# Patient Record
Sex: FEMALE | Race: WHITE | NOT HISPANIC OR LATINO | Employment: UNEMPLOYED | URBAN - METROPOLITAN AREA
[De-identification: names, ages, dates, MRNs, and addresses within clinical notes are randomized per-mention and may not be internally consistent; named-entity substitution may affect disease eponyms.]

---

## 2021-08-11 PROBLEM — F31.75 BIPOLAR DISORDER, IN PARTIAL REMISSION, MOST RECENT EPISODE DEPRESSED: Status: ACTIVE | Noted: 2018-10-05

## 2021-08-11 PROBLEM — F41.1 GAD (GENERALIZED ANXIETY DISORDER): Status: ACTIVE | Noted: 2017-10-09

## 2022-03-02 PROBLEM — Z34.90 PREGNANCY: Status: ACTIVE | Noted: 2022-03-02

## 2023-04-10 ENCOUNTER — OFFICE VISIT (OUTPATIENT)
Dept: FAMILY MEDICINE CLINIC | Facility: CLINIC | Age: 43
End: 2023-04-10
Payer: COMMERCIAL

## 2023-04-10 VITALS
BODY MASS INDEX: 35.33 KG/M2 | OXYGEN SATURATION: 98 % | DIASTOLIC BLOOD PRESSURE: 72 MMHG | WEIGHT: 192 LBS | HEIGHT: 62 IN | HEART RATE: 67 BPM | SYSTOLIC BLOOD PRESSURE: 128 MMHG | RESPIRATION RATE: 20 BRPM

## 2023-04-10 DIAGNOSIS — F31.9 BIPOLAR DISORDER WITH DEPRESSION: Primary | ICD-10-CM

## 2023-04-10 DIAGNOSIS — F41.9 ANXIETY: ICD-10-CM

## 2023-04-10 DIAGNOSIS — F51.01 PRIMARY INSOMNIA: ICD-10-CM

## 2023-04-10 PROBLEM — G57.50 TARSAL TUNNEL SYNDROME: Status: ACTIVE | Noted: 2017-10-09

## 2023-04-10 PROBLEM — Z34.90 PREGNANCY: Status: RESOLVED | Noted: 2022-03-02 | Resolved: 2023-04-10

## 2023-04-10 PROBLEM — R20.2 NUMBNESS AND TINGLING: Status: ACTIVE | Noted: 2017-09-30

## 2023-04-10 PROBLEM — R20.2 PARESTHESIA OF BOTH HANDS: Status: ACTIVE | Noted: 2017-10-09

## 2023-04-10 PROBLEM — R20.0 NUMBNESS AND TINGLING: Status: ACTIVE | Noted: 2017-09-30

## 2023-04-10 PROBLEM — M79.2 NEUROPATHIC PAIN: Status: ACTIVE | Noted: 2017-09-30

## 2023-04-10 RX ORDER — QUETIAPINE FUMARATE 50 MG/1
TABLET, EXTENDED RELEASE ORAL
Qty: 30 TABLET | Refills: 0 | Status: SHIPPED | OUTPATIENT
Start: 2023-04-10
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
## 2023-04-11 ENCOUNTER — OFFICE VISIT (OUTPATIENT)
Dept: OBSTETRICS AND GYNECOLOGY | Facility: CLINIC | Age: 43
End: 2023-04-11
Payer: COMMERCIAL

## 2023-04-11 VITALS
DIASTOLIC BLOOD PRESSURE: 64 MMHG | WEIGHT: 192.6 LBS | BODY MASS INDEX: 35.44 KG/M2 | HEIGHT: 62 IN | SYSTOLIC BLOOD PRESSURE: 118 MMHG

## 2023-04-11 DIAGNOSIS — Z01.419 ENCOUNTER FOR GYNECOLOGICAL EXAMINATION WITHOUT ABNORMAL FINDING: Primary | ICD-10-CM

## 2023-04-11 LAB
ALBUMIN SERPL-MCNC: 4.7 G/DL (ref 3.8–4.8)
ALBUMIN/GLOB SERPL: 2 {RATIO} (ref 1.2–2.2)
ALP SERPL-CCNC: 63 IU/L (ref 44–121)
ALT SERPL-CCNC: 14 IU/L (ref 0–32)
APPEARANCE UR: CLEAR
AST SERPL-CCNC: 16 IU/L (ref 0–40)
BACTERIA #/AREA URNS HPF: NORMAL /[HPF]
BASOPHILS # BLD AUTO: 0 X10E3/UL (ref 0–0.2)
BASOPHILS NFR BLD AUTO: 0 %
BILIRUB SERPL-MCNC: 0.3 MG/DL (ref 0–1.2)
BILIRUB UR QL STRIP: NEGATIVE
BUN SERPL-MCNC: 11 MG/DL (ref 6–24)
BUN/CREAT SERPL: 15 (ref 9–23)
CALCIUM SERPL-MCNC: 9.5 MG/DL (ref 8.7–10.2)
CASTS URNS QL MICRO: NORMAL /LPF
CHLORIDE SERPL-SCNC: 104 MMOL/L (ref 96–106)
CO2 SERPL-SCNC: 23 MMOL/L (ref 20–29)
COLOR UR: YELLOW
CREAT SERPL-MCNC: 0.71 MG/DL (ref 0.57–1)
EGFRCR SERPLBLD CKD-EPI 2021: 109 ML/MIN/1.73
EOSINOPHIL # BLD AUTO: 0.1 X10E3/UL (ref 0–0.4)
EOSINOPHIL NFR BLD AUTO: 2 %
EPI CELLS #/AREA URNS HPF: NORMAL /HPF (ref 0–10)
ERYTHROCYTE [DISTWIDTH] IN BLOOD BY AUTOMATED COUNT: 12.1 % (ref 11.7–15.4)
GLOBULIN SER CALC-MCNC: 2.4 G/DL (ref 1.5–4.5)
GLUCOSE SERPL-MCNC: 97 MG/DL (ref 70–99)
GLUCOSE UR QL STRIP: NEGATIVE
HCT VFR BLD AUTO: 42 % (ref 34–46.6)
HGB BLD-MCNC: 14.3 G/DL (ref 11.1–15.9)
HGB UR QL STRIP: NEGATIVE
IMM GRANULOCYTES # BLD AUTO: 0 X10E3/UL (ref 0–0.1)
IMM GRANULOCYTES NFR BLD AUTO: 0 %
KETONES UR QL STRIP: NEGATIVE
LEUKOCYTE ESTERASE UR QL STRIP: NEGATIVE
LYMPHOCYTES # BLD AUTO: 1.3 X10E3/UL (ref 0.7–3.1)
LYMPHOCYTES NFR BLD AUTO: 21 %
MCH RBC QN AUTO: 30.4 PG (ref 26.6–33)
MCHC RBC AUTO-ENTMCNC: 34 G/DL (ref 31.5–35.7)
MCV RBC AUTO: 89 FL (ref 79–97)
MICRO URNS: ABNORMAL
MICRO URNS: ABNORMAL
MONOCYTES # BLD AUTO: 0.3 X10E3/UL (ref 0.1–0.9)
MONOCYTES NFR BLD AUTO: 5 %
NEUTROPHILS # BLD AUTO: 4.5 X10E3/UL (ref 1.4–7)
NEUTROPHILS NFR BLD AUTO: 72 %
NITRITE UR QL STRIP: NEGATIVE
PH UR STRIP: 8.5 [PH] (ref 5–7.5)
PLATELET # BLD AUTO: 225 X10E3/UL (ref 150–450)
POTASSIUM SERPL-SCNC: 4.1 MMOL/L (ref 3.5–5.2)
PROT SERPL-MCNC: 7.1 G/DL (ref 6–8.5)
PROT UR QL STRIP: NEGATIVE
RBC # BLD AUTO: 4.7 X10E6/UL (ref 3.77–5.28)
RBC #/AREA URNS HPF: NORMAL /HPF (ref 0–2)
SODIUM SERPL-SCNC: 140 MMOL/L (ref 134–144)
SP GR UR STRIP: 1.02 (ref 1–1.03)
UROBILINOGEN UR STRIP-MCNC: 0.2 MG/DL (ref 0.2–1)
WBC # BLD AUTO: 6.3 X10E3/UL (ref 3.4–10.8)
WBC #/AREA URNS HPF: NORMAL /HPF (ref 0–5)

## 2023-04-11 NOTE — PROGRESS NOTES
GYN Annual Exam     CC- Here for annual exam.     Cherie Gutierrez is a 42 y.o. female who presents for annual well woman exam. Periods are regular every 28-30 days, lasting a few days. Dysmenorrhea:none. Cyclic symptoms include none. No intermenstrual bleeding, spotting, or discharge.    She has noted more pelvic prolapse symptoms and has felt significant fullness in the vagina and prolapsed tissue when she is carrying her baby for longer periods of time.  Her baby is now 13 months old and doing well.    OB History        7    Para   4    Term   4       0    AB   3    Living   4       SAB   1    IAB   2    Ectopic   0    Molar   0    Multiple   0    Live Births   4                Current contraception: none  History of abnormal Pap smear: no  Family history of uterine, colon or ovarian cancer: yes - Maternal grandmother with colon cancer  History of abnormal mammogram: no  Family history of breast cancer: no  Last Pap : Several years ago    Past Medical History:   Diagnosis Date   • Abnormal Pap smear of cervix    • Anxiety    • Asthma    • Bipolar 1 disorder, depressed    • Insomnia        Past Surgical History:   Procedure Laterality Date   • DILATATION AND CURETTAGE     • WISDOM TOOTH EXTRACTION           Current Outpatient Medications:   •  QUEtiapine fumarate ER (SEROquel XR) 50 MG tablet sustained-release 24 hour tablet, 1 tab nighty x 3 days,  then 2 tabs nightly, Disp: 30 tablet, Rfl: 0  •  venlafaxine XR (EFFEXOR-XR) 150 MG 24 hr capsule, Take 1 capsule by mouth Daily. (Patient taking differently: Take 1 capsule by mouth Daily. Weaning off), Disp: 90 capsule, Rfl: 3    No Known Allergies    Social History     Tobacco Use   • Smoking status: Some Days     Types: Cigarettes   • Smokeless tobacco: Never   Vaping Use   • Vaping Use: Every day   • Substances: Nicotine, Flavoring   Substance Use Topics   • Alcohol use: Yes     Comment: OCC every few weeks   • Drug use: Yes     Types: Marijuana  "      Family History   Problem Relation Age of Onset   • Colon cancer Maternal Grandmother    • Autism Son        Review of Systems   Constitutional: Negative for fatigue and fever.   Respiratory: Negative for cough and shortness of breath.    Genitourinary: Positive for pelvic pressure. Negative for menstrual problem.       /64   Ht 157.5 cm (62\")   Wt 87.4 kg (192 lb 9.6 oz)   LMP 03/20/2023 (Approximate)   BMI 35.23 kg/m²     Physical Exam  Constitutional:       Appearance: She is normal weight.   Genitourinary:      Bladder and urethral meatus normal.      No lesions in the vagina.      Right Labia: No lesions.     Left Labia: No lesions.     No vaginal tenderness or bleeding.      Anterior and posterior vaginal prolapse present.       Right Adnexa: not tender, not full and no mass present.     Left Adnexa: not tender, not full and no mass present.     Uterus is prolapsed.      Uterus is not enlarged, fixed or tender.      No uterine mass detected.     Uterus is midaxial.   Breasts:     Right: No mass, nipple discharge, skin change or tenderness.      Left: No mass, nipple discharge, skin change or tenderness.   Neck:      Thyroid: No thyroid mass or thyromegaly.   Abdominal:      General: Abdomen is flat.      Palpations: Abdomen is soft. There is no mass.      Tenderness: There is no abdominal tenderness.   Neurological:      Mental Status: She is alert.   Vitals reviewed.               Assessment     1) GYN annual well woman exam.   2) pelvic organ prolapse with moderate cystocele and rectocele and uterine prolapse.  Discussed with patient conservative measures and likelihood/chance of progression of symptoms that might require more aggressive management.     Plan     1) Breast Health - Clinical breast exam yearly, Discussed American cancer society recommendations for breast cancer screening, and Self breast awareness monthly  2) Pap -done with high risk HPV  3) Smoking status-positive  4) " Encouraged to be wary of information obtained via social media and internet based on source and search.  5) Follow up prn and one year.       Eder Collins MD   4/11/2023  15:18 EDT

## 2023-04-12 ENCOUNTER — TELEPHONE (OUTPATIENT)
Dept: FAMILY MEDICINE CLINIC | Facility: CLINIC | Age: 43
End: 2023-04-12
Payer: COMMERCIAL

## 2023-04-12 DIAGNOSIS — Z32.00 POSSIBLE PREGNANCY: Primary | ICD-10-CM

## 2023-04-12 LAB — SPECIMEN STATUS: NORMAL

## 2023-04-12 RX ORDER — VENLAFAXINE HYDROCHLORIDE 75 MG/1
75 CAPSULE, EXTENDED RELEASE ORAL DAILY
Qty: 3 CAPSULE | Refills: 0 | Status: SHIPPED | OUTPATIENT
Start: 2023-04-12 | End: 2023-04-15

## 2023-04-12 NOTE — TELEPHONE ENCOUNTER
Pt called stating she was seen on 4/10 and told to taper off her meds by vale. Can't break the meds in 1/2 because they are extended release capsules. Please call pt back at [phone].

## 2023-04-14 ENCOUNTER — PATIENT ROUNDING (BHMG ONLY) (OUTPATIENT)
Dept: FAMILY MEDICINE CLINIC | Facility: CLINIC | Age: 43
End: 2023-04-14
Payer: COMMERCIAL

## 2023-04-14 NOTE — PROGRESS NOTES
A [employer] message has been sent to the patient for PATIENT ROUNDING with [facility].

## 2023-04-16 LAB
CYTOLOGIST CVX/VAG CYTO: NORMAL
CYTOLOGY CVX/VAG DOC CYTO: NORMAL
CYTOLOGY CVX/VAG DOC THIN PREP: NORMAL
DX ICD CODE: NORMAL
HIV 1 & 2 AB SER-IMP: NORMAL
HPV GENOTYPE REFLEX: NORMAL
HPV I/H RISK 4 DNA CVX QL PROBE+SIG AMP: NEGATIVE
OTHER STN SPEC: NORMAL
STAT OF ADQ CVX/VAG CYTO-IMP: NORMAL

## 2023-04-28 RX ORDER — QUETIAPINE FUMARATE 50 MG/1
TABLET, EXTENDED RELEASE ORAL
Qty: 30 TABLET | Refills: 0 | Status: SHIPPED | OUTPATIENT
Start: 2023-04-28

## 2023-05-02 ENCOUNTER — OFFICE VISIT (OUTPATIENT)
Dept: FAMILY MEDICINE CLINIC | Facility: CLINIC | Age: 43
End: 2023-05-02
Payer: COMMERCIAL

## 2023-05-02 VITALS
RESPIRATION RATE: 18 BRPM | BODY MASS INDEX: 34.41 KG/M2 | HEART RATE: 78 BPM | HEIGHT: 62 IN | DIASTOLIC BLOOD PRESSURE: 80 MMHG | SYSTOLIC BLOOD PRESSURE: 128 MMHG | OXYGEN SATURATION: 98 % | WEIGHT: 187 LBS

## 2023-05-02 DIAGNOSIS — F31.81 BIPOLAR 2 DISORDER, MAJOR DEPRESSIVE EPISODE: ICD-10-CM

## 2023-05-02 DIAGNOSIS — R09.82 PND (POST-NASAL DRIP): Primary | ICD-10-CM

## 2023-05-02 DIAGNOSIS — F41.1 GAD (GENERALIZED ANXIETY DISORDER): ICD-10-CM

## 2023-05-02 DIAGNOSIS — J30.2 SEASONAL ALLERGIES: ICD-10-CM

## 2023-05-02 RX ORDER — CETIRIZINE HYDROCHLORIDE 10 MG/1
10 TABLET ORAL DAILY
Qty: 30 TABLET | Refills: 0 | Status: SHIPPED | OUTPATIENT
Start: 2023-05-02

## 2023-05-02 RX ORDER — FLUTICASONE PROPIONATE 50 MCG
2 SPRAY, SUSPENSION (ML) NASAL DAILY
Qty: 18.2 ML | Refills: 2 | Status: SHIPPED | OUTPATIENT
Start: 2023-05-02

## 2023-05-02 RX ORDER — HYDROXYZINE HYDROCHLORIDE 25 MG/1
25 TABLET, FILM COATED ORAL 2 TIMES DAILY PRN
Qty: 60 TABLET | Refills: 0 | Status: SHIPPED | OUTPATIENT
Start: 2023-05-02 | End: 2023-06-01

## 2023-05-02 RX ORDER — ESCITALOPRAM OXALATE 10 MG/1
10 TABLET ORAL DAILY
Qty: 30 TABLET | Refills: 1 | Status: SHIPPED | OUTPATIENT
Start: 2023-05-02 | End: 2023-06-01

## 2023-05-02 RX ORDER — QUETIAPINE FUMARATE 50 MG/1
TABLET, EXTENDED RELEASE ORAL
Qty: 30 TABLET | Refills: 1 | Status: SHIPPED | OUTPATIENT
Start: 2023-05-02

## 2023-05-02 NOTE — PROGRESS NOTES
Emilia Gutierrez is a 42 y.o. female.     History of Present Illness   Established patient here for acute cough and for follow-up on bipolar depression    Acute coughing  X 10 days , stuffy nose. She is feeling better today. No fever., Took some benadryl and ibuprofen and using humidifier.     Acute on chronic bipolar II, worsening anxiety and depression. Back on seroquel 100 mg nightly. H.o panic attacks.  Partner is with her and reports that there are days where she is nonfunctional due to panic attacks.  Patient has upcoming psych APRN appointment June 8.  She reports she had a rough couple of days this last week but is feeling more stable today.  She denies SI/HI.  She is asking for additional medication to help with panic and anxiety prior to psychiatric APRN appointment.  She has tried Lexapro and Prozac in the past.  She cannot recall what other medications she has been on.  She does not think she has ever been on lithium or Depakote.    The following portions of the patient's history were reviewed and updated as appropriate: allergies, current medications, past family history, past medical history, past social history, past surgical history and problem list.    Review of Systems   HENT: Positive for postnasal drip.         Itchy nose   Eyes: Positive for itching.   Allergic/Immunologic: Positive for environmental allergies.       Objective   Physical Exam  Vitals reviewed.   Constitutional:       Appearance: Normal appearance.   HENT:      Right Ear: Tympanic membrane normal.      Left Ear: There is impacted cerumen.      Mouth/Throat:      Mouth: Mucous membranes are moist.   Cardiovascular:      Rate and Rhythm: Normal rate and regular rhythm.      Pulses: Normal pulses.      Heart sounds: Normal heart sounds.   Pulmonary:      Effort: Pulmonary effort is normal.      Breath sounds: Normal breath sounds. No wheezing or rhonchi.   Musculoskeletal:      Cervical back: Normal range of motion.    Lymphadenopathy:      Cervical: No cervical adenopathy.   Skin:     General: Skin is warm.   Neurological:      Mental Status: She is alert.   Psychiatric:         Mood and Affect: Mood is anxious and depressed.         Vitals:    05/02/23 1415   BP: 128/80   Pulse: 78   Resp: 18   SpO2: 98%     Body mass index is 34.2 kg/m².    Procedures    Assessment & Plan   Problems Addressed this Visit        Mental Health    Bipolar 2 disorder, major depressive episode    Relevant Medications    QUEtiapine fumarate ER (SEROquel XR) 50 MG tablet sustained-release 24 hour tablet    escitalopram (Lexapro) 10 MG tablet    hydrOXYzine (ATARAX) 25 MG tablet    TEE (generalized anxiety disorder)    Relevant Medications    QUEtiapine fumarate ER (SEROquel XR) 50 MG tablet sustained-release 24 hour tablet    escitalopram (Lexapro) 10 MG tablet    hydrOXYzine (ATARAX) 25 MG tablet   Other Visit Diagnoses     PND (post-nasal drip)    -  Primary    Seasonal allergies          Diagnoses       Codes Comments    PND (post-nasal drip)    -  Primary ICD-10-CM: R09.82  ICD-9-CM: 784.91     Seasonal allergies     ICD-10-CM: J30.2  ICD-9-CM: 477.9     Bipolar 2 disorder, major depressive episode     ICD-10-CM: F31.81  ICD-9-CM: 296.89     TEE (generalized anxiety disorder)     ICD-10-CM: F41.1  ICD-9-CM: 300.02         PND/allergic symptoms-take Zyrtec 10 mg daily, Rx Flonase 2 sprays daily, limit triggers, use saline nasal spray, humidifier.  Call if fever or worsening symptoms    Bipolar/anxiety/depression- she has previously been on venlafaxine and does not wish to continue this medication, discussed SSRI Lexapro 10 mg daily, reviewed side effect profile, discussed hydroxyzine 25 mg twice daily for panic attack prevention and anxiety management.  Reviewed side effect profile including sedation.  Do not drink alcohol or drive with this medication.  Reviewed increased risk of suicidality with starting SSRI.  Call [phone] or go to ER for worsening  symptoms continue with Seroquel 50 mg nightly x3 days and then double to 100 mg nightly.         Education provided in AVS   No follow-ups on file.  Answers for HPI/ROS submitted by the patient on 5/1/2023  Please describe your symptoms.: Coming back for a med check. Also I had another crisis over the weekend  Have you had these symptoms before?: Yes  How long have you been having these symptoms?: Greater than 2 weeks  Please list any medications you are currently taking for this condition.: Venlafaxine, Seroquel  Please describe any probable cause for these symptoms. : Bipolar 2  What is the primary reason for your visit?: Other

## 2023-05-16 ENCOUNTER — OFFICE VISIT (OUTPATIENT)
Dept: FAMILY MEDICINE CLINIC | Facility: CLINIC | Age: 43
End: 2023-05-16
Payer: COMMERCIAL

## 2023-05-16 VITALS
OXYGEN SATURATION: 99 % | HEART RATE: 70 BPM | WEIGHT: 194 LBS | DIASTOLIC BLOOD PRESSURE: 70 MMHG | SYSTOLIC BLOOD PRESSURE: 110 MMHG | HEIGHT: 62 IN | BODY MASS INDEX: 35.7 KG/M2

## 2023-05-16 DIAGNOSIS — F31.81 BIPOLAR 2 DISORDER, MAJOR DEPRESSIVE EPISODE: Primary | ICD-10-CM

## 2023-05-16 DIAGNOSIS — G89.29 CHRONIC MIDLINE LOW BACK PAIN WITHOUT SCIATICA: ICD-10-CM

## 2023-05-16 DIAGNOSIS — M54.50 CHRONIC MIDLINE LOW BACK PAIN WITHOUT SCIATICA: ICD-10-CM

## 2023-05-16 RX ORDER — ACETAMINOPHEN 500 MG
1000 TABLET ORAL EVERY 8 HOURS PRN
Qty: 170 TABLET | Refills: 0 | Status: SHIPPED | OUTPATIENT
Start: 2023-05-16 | End: 2023-06-15

## 2023-05-16 RX ORDER — QUETIAPINE FUMARATE 50 MG/1
TABLET, EXTENDED RELEASE ORAL
Qty: 60 TABLET | Refills: 1 | Status: SHIPPED | OUTPATIENT
Start: 2023-05-16
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
## 2023-05-16 NOTE — PATIENT INSTRUCTIONS
Magnesium glycinate 500 mg nightly for muscle pain, insomnia, anxiety    Lidocaine patches for pain  [place] patch    Look up antiinflammatory diet

## 2023-05-30 RX ORDER — CETIRIZINE HYDROCHLORIDE 10 MG/1
10 TABLET ORAL DAILY
Qty: 30 TABLET | Refills: 0 | Status: SHIPPED | OUTPATIENT
Start: 2023-05-30

## 2023-06-06 RX ORDER — HYDROXYZINE HYDROCHLORIDE 25 MG/1
TABLET, FILM COATED ORAL
Qty: 60 TABLET | Refills: 0 | Status: SHIPPED | OUTPATIENT
Start: 2023-06-06

## 2023-06-16 ENCOUNTER — TELEPHONE (OUTPATIENT)
Dept: FAMILY MEDICINE CLINIC | Facility: CLINIC | Age: 43
End: 2023-06-16

## 2023-06-16 NOTE — TELEPHONE ENCOUNTER
"    Caller: Cherie Gutierrez \"Melida\"    Relationship: Self    Best call back number: [phone]    What medications are you currently taking:   Current Outpatient Medications on File Prior to Visit   Medication Sig Dispense Refill    [] acetaminophen (TYLENOL) 500 MG tablet Take 2 tablets by mouth Every 8 (Eight) Hours As Needed for Mild Pain for up to 30 days. 170 tablet 0    cetirizine (zyrTEC) 10 MG tablet TAKE 1 TABLET BY MOUTH DAILY 30 tablet 0    [] Diclofenac Sodium (VOLTAREN) 1 % gel gel Apply 4 g topically to the appropriate area as directed 3 (Three) Times a Day for 30 days. 100 g 0    escitalopram (Lexapro) 10 MG tablet Take 1 tablet by mouth Daily for 30 days. 30 tablet 1    fluticasone (FLONASE) 50 MCG/ACT nasal spray 2 sprays into the nostril(s) as directed by provider Daily. 18.2 mL 2    hydrOXYzine (ATARAX) 25 MG tablet TAKE 1 TABLET BY MOUTH TWICE DAILY AS NEEDED FOR ANXIETY 60 tablet 0    QUEtiapine fumarate ER (SEROquel XR) 50 MG tablet sustained-release 24 hour tablet TAKE 2 TABs BY MOUTH EVERY EVENING 60 tablet 1     No current facility-administered medications on file prior to visit.            Which medication are you concerned about:     QUEtiapine fumarate ER (SEROquel XR) 50 MG tablet sustained-release 24 hour tablet       Who prescribed you this medication: DARRON MARION    What are your concerns: BEEN TAKING THIS MEDICATION A COUPLE OF MONTHS BUT LAST NIGHT WAS HOME ALONE AND BABY WOKE UP, WAS AWAKE A LONG TIME AND PATIENT HAD MUCH DIFFICULTY STAYING UP WITH BABY.    SHE ALSO THOUGHT SHE HAD HALLUCINATIONS WITH IT(WHILE TRYING TO STAY AWAKE), EXTREMELY   GROGGY    SHE WANTS PROVIDER TO KNOW THAT SHE WILL STOP THE MEDICATION, WANTS TO KNOW IF SHE NEEDS TO WEAN OFF FIRST.    "

## 2023-08-03 ENCOUNTER — OFFICE VISIT (OUTPATIENT)
Dept: FAMILY MEDICINE CLINIC | Facility: CLINIC | Age: 43
End: 2023-08-03
Payer: COMMERCIAL

## 2023-08-03 VITALS
SYSTOLIC BLOOD PRESSURE: 132 MMHG | HEIGHT: 62 IN | OXYGEN SATURATION: 100 % | RESPIRATION RATE: 18 BRPM | BODY MASS INDEX: 34.41 KG/M2 | HEART RATE: 80 BPM | WEIGHT: 187 LBS | DIASTOLIC BLOOD PRESSURE: 92 MMHG

## 2023-08-03 DIAGNOSIS — G89.29 CHRONIC GENERALIZED PAIN: ICD-10-CM

## 2023-08-03 DIAGNOSIS — F31.9 BIPOLAR 1 DISORDER: ICD-10-CM

## 2023-08-03 DIAGNOSIS — R52 CHRONIC GENERALIZED PAIN: ICD-10-CM

## 2023-08-03 DIAGNOSIS — R53.82 CHRONIC FATIGUE: ICD-10-CM

## 2023-08-03 PROBLEM — F31.81 BIPOLAR 2 DISORDER, MAJOR DEPRESSIVE EPISODE: Status: RESOLVED | Noted: 2018-10-05 | Resolved: 2023-08-03

## 2023-08-03 RX ORDER — ARIPIPRAZOLE 5 MG/1
5 TABLET ORAL DAILY
Qty: 30 TABLET | Refills: 0
Start: 2023-08-03 | End: 2023-09-02

## 2023-08-03 RX ORDER — ESCITALOPRAM OXALATE 10 MG/1
TABLET ORAL
COMMUNITY
Start: 2023-07-24 | End: 2023-08-03

## 2023-08-03 RX ORDER — QUETIAPINE FUMARATE 50 MG/1
TABLET, EXTENDED RELEASE ORAL
COMMUNITY
Start: 2023-07-24 | End: 2023-08-03

## 2023-08-04 LAB
25(OH)D3+25(OH)D2 SERPL-MCNC: 24.8 NG/ML (ref 30–100)
ALBUMIN SERPL-MCNC: 4.7 G/DL (ref 3.9–4.9)
ALBUMIN/GLOB SERPL: 2.2 {RATIO} (ref 1.2–2.2)
ALP SERPL-CCNC: 73 IU/L (ref 44–121)
ALT SERPL-CCNC: 17 IU/L (ref 0–32)
ANA SER QL: NEGATIVE
AST SERPL-CCNC: 15 IU/L (ref 0–40)
BASOPHILS # BLD AUTO: 0 X10E3/UL (ref 0–0.2)
BASOPHILS NFR BLD AUTO: 0 %
BILIRUB SERPL-MCNC: 0.3 MG/DL (ref 0–1.2)
BUN SERPL-MCNC: 14 MG/DL (ref 6–24)
BUN/CREAT SERPL: 19 (ref 9–23)
CALCIUM SERPL-MCNC: 9.6 MG/DL (ref 8.7–10.2)
CHLORIDE SERPL-SCNC: 102 MMOL/L (ref 96–106)
CK SERPL-CCNC: 74 U/L (ref 32–182)
CO2 SERPL-SCNC: 24 MMOL/L (ref 20–29)
CREAT SERPL-MCNC: 0.72 MG/DL (ref 0.57–1)
EGFRCR SERPLBLD CKD-EPI 2021: 107 ML/MIN/1.73
EOSINOPHIL # BLD AUTO: 0.1 X10E3/UL (ref 0–0.4)
EOSINOPHIL NFR BLD AUTO: 1 %
ERYTHROCYTE [DISTWIDTH] IN BLOOD BY AUTOMATED COUNT: 12.3 % (ref 11.7–15.4)
ERYTHROCYTE [SEDIMENTATION RATE] IN BLOOD BY WESTERGREN METHOD: 2 MM/HR (ref 0–32)
FOLATE SERPL-MCNC: 13 NG/ML
GLOBULIN SER CALC-MCNC: 2.1 G/DL (ref 1.5–4.5)
GLUCOSE SERPL-MCNC: 81 MG/DL (ref 70–99)
HCT VFR BLD AUTO: 39 % (ref 34–46.6)
HGB BLD-MCNC: 12.9 G/DL (ref 11.1–15.9)
IMM GRANULOCYTES # BLD AUTO: 0 X10E3/UL (ref 0–0.1)
IMM GRANULOCYTES NFR BLD AUTO: 0 %
LYMPHOCYTES # BLD AUTO: 2.4 X10E3/UL (ref 0.7–3.1)
LYMPHOCYTES NFR BLD AUTO: 35 %
MAGNESIUM SERPL-MCNC: 2 MG/DL (ref 1.6–2.3)
MCH RBC QN AUTO: 29.3 PG (ref 26.6–33)
MCHC RBC AUTO-ENTMCNC: 33.1 G/DL (ref 31.5–35.7)
MCV RBC AUTO: 88 FL (ref 79–97)
MONOCYTES # BLD AUTO: 0.5 X10E3/UL (ref 0.1–0.9)
MONOCYTES NFR BLD AUTO: 7 %
NEUTROPHILS # BLD AUTO: 3.9 X10E3/UL (ref 1.4–7)
NEUTROPHILS NFR BLD AUTO: 57 %
PLATELET # BLD AUTO: 256 X10E3/UL (ref 150–450)
POTASSIUM SERPL-SCNC: 4 MMOL/L (ref 3.5–5.2)
PROT SERPL-MCNC: 6.8 G/DL (ref 6–8.5)
RBC # BLD AUTO: 4.41 X10E6/UL (ref 3.77–5.28)
SODIUM SERPL-SCNC: 140 MMOL/L (ref 134–144)
T4 FREE SERPL-MCNC: 1.29 NG/DL (ref 0.82–1.77)
THYROPEROXIDASE AB SERPL-ACNC: 10 IU/ML (ref 0–34)
TSH SERPL DL<=0.005 MIU/L-ACNC: 0.79 UIU/ML (ref 0.45–4.5)
VIT B12 SERPL-MCNC: 356 PG/ML (ref 232–1245)
WBC # BLD AUTO: 6.9 X10E3/UL (ref 3.4–10.8)

## 2023-08-07 RX ORDER — ERGOCALCIFEROL 1.25 MG/1
50000 CAPSULE ORAL WEEKLY
Qty: 8 CAPSULE | Refills: 0 | Status: SHIPPED | OUTPATIENT
Start: 2023-08-07 | End: 2023-10-06

## 2023-08-16 RX ORDER — PSEUDOEPHED/ACETAMINOPH/DIPHEN 30MG-500MG
TABLET ORAL
Qty: 170 TABLET | Refills: 0 | Status: SHIPPED | OUTPATIENT
Start: 2023-08-16

## 2024-05-01 ENCOUNTER — OFFICE VISIT (OUTPATIENT)
Dept: FAMILY MEDICINE CLINIC | Facility: CLINIC | Age: 44
End: 2024-05-01
Payer: COMMERCIAL

## 2024-05-01 VITALS
SYSTOLIC BLOOD PRESSURE: 126 MMHG | WEIGHT: 180 LBS | HEIGHT: 62 IN | RESPIRATION RATE: 18 BRPM | BODY MASS INDEX: 33.13 KG/M2 | DIASTOLIC BLOOD PRESSURE: 94 MMHG

## 2024-05-01 DIAGNOSIS — E66.1 CLASS 1 DRUG-INDUCED OBESITY WITHOUT SERIOUS COMORBIDITY WITH BODY MASS INDEX (BMI) OF 32.0 TO 32.9 IN ADULT: ICD-10-CM

## 2024-05-01 DIAGNOSIS — M25.551 RIGHT HIP PAIN: ICD-10-CM

## 2024-05-01 DIAGNOSIS — Z13.220 SCREENING, LIPID: ICD-10-CM

## 2024-05-01 DIAGNOSIS — F31.9 BIPOLAR 1 DISORDER: ICD-10-CM

## 2024-05-01 DIAGNOSIS — Z00.00 ANNUAL PHYSICAL EXAM: Primary | ICD-10-CM

## 2024-05-01 DIAGNOSIS — Z12.31 SCREENING MAMMOGRAM FOR BREAST CANCER: ICD-10-CM

## 2024-05-01 DIAGNOSIS — Z23 NEED FOR VACCINATION: ICD-10-CM

## 2024-05-01 DIAGNOSIS — Z13.228 SCREENING FOR METABOLIC DISORDER: ICD-10-CM

## 2024-05-01 PROBLEM — E66.811 CLASS 1 DRUG-INDUCED OBESITY WITHOUT SERIOUS COMORBIDITY WITH BODY MASS INDEX (BMI) OF 32.0 TO 32.9 IN ADULT: Status: ACTIVE | Noted: 2024-05-01

## 2024-05-01 PROCEDURE — 91320 SARSCV2 VAC 30MCG TRS-SUC IM: CPT | Performed by: NURSE PRACTITIONER

## 2024-05-01 PROCEDURE — 99396 PREV VISIT EST AGE 40-64: CPT | Performed by: NURSE PRACTITIONER

## 2024-05-01 PROCEDURE — 90715 TDAP VACCINE 7 YRS/> IM: CPT | Performed by: NURSE PRACTITIONER

## 2024-05-01 PROCEDURE — 90480 ADMN SARSCOV2 VAC 1/ONLY CMP: CPT | Performed by: NURSE PRACTITIONER

## 2024-05-01 PROCEDURE — 90471 IMMUNIZATION ADMIN: CPT | Performed by: NURSE PRACTITIONER

## 2024-05-01 RX ORDER — MELOXICAM 7.5 MG/1
TABLET ORAL
Qty: 30 TABLET | Refills: 3 | Status: SHIPPED | OUTPATIENT
Start: 2024-05-01

## 2024-05-01 RX ORDER — ARIPIPRAZOLE 10 MG/1
1 TABLET ORAL DAILY
COMMUNITY
Start: 2024-04-26

## 2024-05-01 RX ORDER — ERGOCALCIFEROL 1.25 MG/1
50000 CAPSULE ORAL WEEKLY
Qty: 8 CAPSULE | Refills: 0 | Status: SHIPPED | OUTPATIENT
Start: 2024-05-01 | End: 2024-06-30

## 2024-05-01 NOTE — PROGRESS NOTES
Subjective   Cherie Gutierrez is a 43 y.o. female.   Patient here for annual physical exam, labs, chronic illness management and updated screening.      History of Present Illness   Chronic Bipolar 1 x years. Seeing online psych and is transitioning to new in person psychiatry. Mood is stable on Abilify 5 mg, venlafaxine 150, hydroxyzine 25 qhs. She uses this as prn for panic attack. Denies SI/HI.   PHQ-2 Depression Screening  Little interest or pleasure in doing things? 0-->not at all   Feeling down, depressed, or hopeless? 0-->not at all   PHQ-2 Total Score 0     Chronic R hip x months. She has prev done some physical therapy at home on phone [facility]. She can bear weight and walk normally, better if she is lying down. She has tried ibuprofen and tylenol without ,much relief.      The following portions of the patient's history were reviewed and updated as appropriate: allergies, current medications, past family history, past medical history, past social history, past surgical history and problem list.    Review of Systems   Constitutional:  Negative for activity change, fatigue, unexpected weight gain and unexpected weight loss.   HENT:  Negative for congestion and postnasal drip.         Dental exam is due      Eyes:  Negative for blurred vision and double vision.        No vision changes, no glasses    Respiratory:  Negative for cough and chest tightness.    Cardiovascular:  Negative for chest pain, palpitations and leg swelling.   Gastrointestinal:  Negative for abdominal pain, blood in stool, constipation, diarrhea and GERD.   Endocrine: Negative for cold intolerance, heat intolerance, polydipsia, polyphagia and polyuria.   Genitourinary:  Negative for dysuria and frequency.   Musculoskeletal:  Positive for arthralgias (R hip).   Skin:  Negative for rash.   Allergic/Immunologic: Negative for environmental allergies.   Neurological:  Negative for dizziness and seizures.   Hematological:  Does not bruise/bleed easily.    Psychiatric/Behavioral:  Positive for stress. Negative for depressed mood. The patient is nervous/anxious.          Current Outpatient Medications:     Acetaminophen Extra Strength 500 MG tablet, TAKE 2 TABLETS BY MOUTH EVERY 8 HOURS AS NEEDED FOR PAIN, Disp: 170 tablet, Rfl: 0    ARIPiprazole (ABILIFY) 10 MG tablet, Take 1 tablet by mouth Daily., Disp: , Rfl:     cetirizine (zyrTEC) 10 MG tablet, TAKE 1 TABLET BY MOUTH DAILY, Disp: 90 tablet, Rfl: 0    fluticasone (FLONASE) 50 MCG/ACT nasal spray, 2 sprays into the nostril(s) as directed by provider Daily., Disp: 18.2 mL, Rfl: 2    hydrOXYzine (ATARAX) 25 MG tablet, TAKE 1 TABLET BY MOUTH TWICE DAILY AS NEEDED FOR ANXIETY, Disp: 60 tablet, Rfl: 0    meloxicam (Mobic) 7.5 MG tablet, Daily w food for pain (no aleve , ibuprofen or advil), Disp: 30 tablet, Rfl: 3    venlafaxine XR (EFFEXOR-XR) 150 MG 24 hr capsule, Take 225 mg by mouth., Disp: , Rfl:     vitamin D (ERGOCALCIFEROL) 1.25 MG (45978 UT) capsule capsule, Take 1 capsule by mouth 1 (One) Time Per Week for 60 days., Disp: 8 capsule, Rfl: 0    Objective   Physical Exam  Vitals reviewed.   Constitutional:       Appearance: Normal appearance. She is normal weight.   HENT:      Head: Normocephalic.      Right Ear: Tympanic membrane normal.      Left Ear: Tympanic membrane normal.      Nose: Nose normal.      Mouth/Throat:      Mouth: Mucous membranes are moist.   Eyes:      Pupils: Pupils are equal, round, and reactive to light.   Cardiovascular:      Rate and Rhythm: Normal rate and regular rhythm.      Pulses: Normal pulses.      Heart sounds: Normal heart sounds.   Pulmonary:      Effort: Pulmonary effort is normal.      Breath sounds: Normal breath sounds.   Abdominal:      General: Abdomen is flat. Bowel sounds are normal.      Palpations: Abdomen is soft.   Musculoskeletal:         General: Tenderness present. Normal range of motion.      Cervical back: Normal range of motion.      Right hip: Tenderness  present. Normal range of motion. Normal strength.        Legs:       Comments: R hip tenderness w flexion,no clicks or obvious deformity   Skin:     General: Skin is warm.   Neurological:      General: No focal deficit present.      Mental Status: She is alert.   Psychiatric:         Mood and Affect: Mood is anxious.         Vitals:    05/01/24 1338   BP: 126/94   Resp: 18     Body mass index is 32.92 kg/m².    Procedures    TSH   Date Value Ref Range Status   08/03/2023 0.792 0.450 - 4.500 uIU/mL Final         Assessment & Plan   Problems Addressed this Visit       RESOLVED: Bipolar 1 disorder    Relevant Medications    ARIPiprazole (ABILIFY) 10 MG tablet    Class 1 drug-induced obesity without serious comorbidity with body mass index (BMI) of 32.0 to 32.9 in adult     Patient's (Body mass index is 32.92 kg/m².) indicates that they are obese (BMI >30) with health conditions that include none . Weight is unchanged. BMI  is above average; BMI management plan is completed. We discussed portion control, increasing exercise, and joining a fitness center or start home based exercise program.           Other Visit Diagnoses       Annual physical exam    -  Primary    Need for vaccination        Relevant Orders    COVID-19 F23 (Pfizer) 12yrs+ (COMIRNATY) (Completed)    Tdap Vaccine => 8yo IM (BOOSTRIX) (Completed)    Screening for metabolic disorder        Screening, lipid        Right hip pain        Relevant Orders    Ambulatory Referral to Physical Therapy (Completed)    Screening mammogram for breast cancer        Relevant Orders    Mammo Screening Digital Tomosynthesis Bilateral With CAD          Diagnoses         Codes Comments    Annual physical exam    -  Primary ICD-10-CM: Z00.00  ICD-9-CM: V70.0     Need for vaccination     ICD-10-CM: Z23  ICD-9-CM: V05.9     Screening for metabolic disorder     ICD-10-CM: Z13.228  ICD-9-CM: V77.99     Screening, lipid     ICD-10-CM: Z13.220  ICD-9-CM: V77.91     Bipolar 1  disorder     ICD-10-CM: F31.9  ICD-9-CM: 296.7     Right hip pain     ICD-10-CM: M25.551  ICD-9-CM: 719.45     Class 1 drug-induced obesity without serious comorbidity with body mass index (BMI) of 32.0 to 32.9 in adult     ICD-10-CM: E66.1, Z68.32  ICD-9-CM: 278.00, V85.32     Screening mammogram for breast cancer     ICD-10-CM: Z12.31  ICD-9-CM: V76.12           Orders Placed This Encounter   Procedures    Mammo Screening Digital Tomosynthesis Bilateral With CAD     Standing Status:   Future     Standing Expiration Date:   5/1/2025     Order Specific Question:   Reason for Exam:     Answer:   breast cancer screen     Order Specific Question:   Release to patient     Answer:   Routine Release [[id number]]    COVID-19 F23 (Pfizer) 12yrs+ (COMIRNATY)    Tdap Vaccine => 6yo IM (BOOSTRIX)    Ambulatory Referral to Physical Therapy     Referral Priority:   Routine     Referral Type:   Physical Therapy     Referral Reason:   Specialty Services Required     Requested Specialty:   Physical Therapy     Number of Visits Requested:   1         Preventative care- Follow heart healthy diet, drink water, walk daily. Wear seatbelts, wear helmets, wear sunscreens. Follow CDC guidelines for covid and flu .     Vit D def- refill weekly vit D then take over the counter d3 2000 Iu daily. Last vit D 24.8    Hip pain- refer PT, rx meloxicam 7.5 qd x 2 weeks then as needed, take w food . Reviewed last .  Last x-rays reviewed from 2017, all negative.     Additional time on acute concerns, referral and medication management, lab review>20 min  Education provided in AVS   Return in about 1 year (around 5/1/2025) for Annual physical.

## 2024-05-01 NOTE — ASSESSMENT & PLAN NOTE
Patient's (Body mass index is 32.92 kg/m².) indicates that they are obese (BMI >30) with health conditions that include none . Weight is unchanged. BMI  is above average; BMI management plan is completed. We discussed portion control, increasing exercise, and joining a fitness center or start home based exercise program.

## 2024-05-29 RX ORDER — VENLAFAXINE HYDROCHLORIDE 150 MG/1
300 CAPSULE, EXTENDED RELEASE ORAL DAILY
Qty: 60 CAPSULE | Refills: 0 | Status: SHIPPED | OUTPATIENT
Start: 2024-05-29 | End: 2024-06-28

## 2024-05-29 NOTE — TELEPHONE ENCOUNTER
"Caller: Cherie Gutierrez \"Melida\"    Relationship: Self    Best call back number: [phone]    Requested Prescriptions:   Requested Prescriptions     Pending Prescriptions Disp Refills    venlafaxine XR (EFFEXOR-XR) 150 MG 24 hr capsule       Sig: Take 2 capsules by mouth.        Pharmacy where request should be sent: [id number] - [place], [place] - [address] [place] [address] AT [facility] OF [address] [facility] - [phone] [facility] [phone] FX     Last office visit with prescribing clinician: 5/1/2024   Last telemedicine visit with prescribing clinician: Visit date not found   Next office visit with prescribing clinician: 5/5/2025     Additional details provided by patient: PATIENT STATES SHE IS WAITING TO SEE HER PSYCHIATRIST AND IS WANTING TO KNOW IF DARRON MARION CAN REFILL THIS MEDICATION FOR HER IN THE MEAN TIME.     PATIENT STATES SHE IS COMPLETELY OUT OF THIS MEDICATION.     Does the patient have less than a 3 day supply:  [x] Yes  [] No    Would you like a call back once the refill request has been completed: [x] Yes [] No    If the office needs to give you a call back, can they leave a voicemail: [x] Yes [] No    Karina Pena, PCT   05/29/24 11:42 EDT         "

## 2024-05-30 ENCOUNTER — TELEPHONE (OUTPATIENT)
Dept: FAMILY MEDICINE CLINIC | Facility: CLINIC | Age: 44
End: 2024-05-30
Payer: COMMERCIAL

## 2024-05-30 NOTE — TELEPHONE ENCOUNTER
Patient's spouse calling stating that she is out of her anti-depressant and is not doing well. Anna states that they need a PA.    Callback: [phone]

## 2024-05-30 NOTE — TELEPHONE ENCOUNTER
Patient states there is a problems with the prescription and [facility]- the code you sent him was only for 30 pills, not 90 as her prescription is written.  Please call him

## 2024-07-01 DIAGNOSIS — F31.9 BIPOLAR 1 DISORDER: Primary | ICD-10-CM

## 2024-07-01 RX ORDER — VENLAFAXINE HYDROCHLORIDE 150 MG/1
300 CAPSULE, EXTENDED RELEASE ORAL DAILY
Qty: 60 CAPSULE | Refills: 0 | Status: SHIPPED | OUTPATIENT
Start: 2024-07-01

## 2024-08-10 DIAGNOSIS — F31.9 BIPOLAR 1 DISORDER: ICD-10-CM

## 2024-08-12 ENCOUNTER — TELEPHONE (OUTPATIENT)
Dept: FAMILY MEDICINE CLINIC | Facility: CLINIC | Age: 44
End: 2024-08-12
Payer: COMMERCIAL

## 2024-08-12 RX ORDER — VENLAFAXINE HYDROCHLORIDE 150 MG/1
300 CAPSULE, EXTENDED RELEASE ORAL DAILY
Qty: 60 CAPSULE | Refills: 0 | Status: SHIPPED | OUTPATIENT
Start: 2024-08-12

## 2024-08-12 NOTE — TELEPHONE ENCOUNTER
Caller: [employer]    Relationship: Emergency Contact    Best call back number: [phone]    Which medication are you concerned about: ARIPiprazole (ABILIFY) 10 MG tablet  AND   venlafaxine XR (EFFEXOR-XR) 150 MG 24 hr capsule   Who prescribed you this medication: MARION        What are your concerns: PATIENT IS OUT OF MEDICINE AND DOES NOT HAVE THERAPY SESSION UNTIL THE 23RD. COULD SHE GET A REFILL UNTIL HER THERAPY APPOINTMENT?   PATIENT GAVE VERBAL PERMISSION FOR  TO RECEIVE INFORMATION..  PLEASE CALL ABOUT REFILL STATUS AND CALL SCRIPT TO:  [facility] #[id number] - [place], [place] - [address] AT [facility] OF [address] - [phone]  - [phone] FX

## 2024-08-13 ENCOUNTER — TELEPHONE (OUTPATIENT)
Dept: FAMILY MEDICINE CLINIC | Facility: CLINIC | Age: 44
End: 2024-08-13

## 2024-08-13 RX ORDER — ARIPIPRAZOLE 10 MG/1
10 TABLET ORAL DAILY
Qty: 30 TABLET | Refills: 0 | Status: SHIPPED | OUTPATIENT
Start: 2024-08-13

## 2024-08-13 NOTE — TELEPHONE ENCOUNTER
Caller: iron card (NOT ON [facility] VERBAL)    Relationship to patient: Emergency Contact    Best call back number: [phone]    Patient is needing:     RETURNING MISSED CALL FROM OFFICE

## 2024-08-19 ENCOUNTER — OFFICE VISIT (OUTPATIENT)
Dept: FAMILY MEDICINE CLINIC | Facility: CLINIC | Age: 44
End: 2024-08-19
Payer: COMMERCIAL

## 2024-08-19 VITALS
RESPIRATION RATE: 18 BRPM | BODY MASS INDEX: 33.13 KG/M2 | SYSTOLIC BLOOD PRESSURE: 148 MMHG | HEIGHT: 62 IN | OXYGEN SATURATION: 100 % | DIASTOLIC BLOOD PRESSURE: 98 MMHG | WEIGHT: 180 LBS | HEART RATE: 105 BPM

## 2024-08-19 DIAGNOSIS — M25.562 LATERAL KNEE PAIN, LEFT: Primary | ICD-10-CM

## 2024-08-19 PROCEDURE — 99213 OFFICE O/P EST LOW 20 MIN: CPT | Performed by: NURSE PRACTITIONER

## 2024-08-19 RX ORDER — MELOXICAM 7.5 MG/1
TABLET ORAL
Qty: 30 TABLET | Refills: 3 | Status: SHIPPED | OUTPATIENT
Start: 2024-08-19

## 2024-08-19 NOTE — PROGRESS NOTES
Subjective   Cherie Gutierrez is a 44 y.o. female.     History of Present Illness   Estab pt here for follow up     Acute L knee injury last month. Twisted knee getting into car pn trip. [place] and felt a pop. She has used knee brace. She twisted this again 2 days ago. No falls. She reports this feels unstable like it might give out. She has been taking ibuprofen.     The following portions of the patient's history were reviewed and updated as appropriate: allergies, current medications, past family history, past medical history, past social history, past surgical history and problem list.    Review of Systems      Current Outpatient Medications:     Acetaminophen Extra Strength 500 MG tablet, TAKE 2 TABLETS BY MOUTH EVERY 8 HOURS AS NEEDED FOR PAIN, Disp: 170 tablet, Rfl: 0    ARIPiprazole (ABILIFY) 10 MG tablet, Take 1 tablet by mouth Daily., Disp: 30 tablet, Rfl: 0    cetirizine (zyrTEC) 10 MG tablet, TAKE 1 TABLET BY MOUTH DAILY, Disp: 90 tablet, Rfl: 0    fluticasone (FLONASE) 50 MCG/ACT nasal spray, 2 sprays into the nostril(s) as directed by provider Daily., Disp: 18.2 mL, Rfl: 2    hydrOXYzine (ATARAX) 25 MG tablet, TAKE 1 TABLET BY MOUTH TWICE DAILY AS NEEDED FOR ANXIETY, Disp: 60 tablet, Rfl: 0    meloxicam (Mobic) 7.5 MG tablet, Daily w food for pain (no aleve , ibuprofen or advil), Disp: 30 tablet, Rfl: 3    venlafaxine XR (EFFEXOR-XR) 150 MG 24 hr capsule, TAKE 2 CAPSULES BY MOUTH DAILY, Disp: 60 capsule, Rfl: 0    Objective   Physical Exam  Vitals reviewed.   Constitutional:       Appearance: Normal appearance.   HENT:      Mouth/Throat:      Mouth: Mucous membranes are moist.   Cardiovascular:      Rate and Rhythm: Regular rhythm.   Musculoskeletal:         General: Tenderness present. No swelling.      Left knee: No effusion or ecchymosis. Normal range of motion. Tenderness present.        Legs:    Skin:     General: Skin is warm.   Neurological:      Mental Status: She is alert.         Vitals:     08/19/24 1135   BP: 148/98   Pulse: 105   Resp: 18   SpO2: 100%     Body mass index is 32.92 kg/m².    Procedures    TSH   Date Value Ref Range Status   08/03/2023 0.792 0.450 - 4.500 uIU/mL Final                   Assessment & Plan   Problems Addressed this Visit    None  Visit Diagnoses       Lateral knee pain, left    -  Primary    Relevant Orders    Ambulatory Referral to Orthopedic Surgery          Diagnoses         Codes Comments    Lateral knee pain, left    -  Primary ICD-10-CM: M25.562  ICD-9-CM: 719.46           Knee pain- refer ortho, rx meloxicam 7.5 qd w food, RICE, ok to wear brace, limit side to side movements if possible          Education provided in AVS   Return if symptoms worsen or fail to improve.

## 2024-09-10 ENCOUNTER — OFFICE VISIT (OUTPATIENT)
Dept: ORTHOPEDIC SURGERY | Facility: CLINIC | Age: 44
End: 2024-09-10
Payer: COMMERCIAL

## 2024-09-10 VITALS — WEIGHT: 160 LBS | HEIGHT: 62 IN | BODY MASS INDEX: 29.44 KG/M2 | TEMPERATURE: 97.5 F

## 2024-09-10 DIAGNOSIS — R52 PAIN: ICD-10-CM

## 2024-09-10 DIAGNOSIS — M25.562 PATELLOFEMORAL ARTHRALGIA OF LEFT KNEE: Primary | ICD-10-CM

## 2024-09-10 PROCEDURE — 99213 OFFICE O/P EST LOW 20 MIN: CPT | Performed by: NURSE PRACTITIONER

## 2024-09-10 PROCEDURE — 73562 X-RAY EXAM OF KNEE 3: CPT | Performed by: NURSE PRACTITIONER
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
## 2024-09-11 ENCOUNTER — PATIENT ROUNDING (BHMG ONLY) (OUTPATIENT)
Dept: ORTHOPEDIC SURGERY | Facility: CLINIC | Age: 44
End: 2024-09-11
Payer: COMMERCIAL

## 2024-09-11 NOTE — PROGRESS NOTES
A [facility] Message has been sent to the patient [place] [facility]

## 2024-10-09 DIAGNOSIS — F31.9 BIPOLAR 1 DISORDER: ICD-10-CM

## 2024-10-09 RX ORDER — VENLAFAXINE HYDROCHLORIDE 150 MG/1
300 CAPSULE, EXTENDED RELEASE ORAL DAILY
Qty: 60 CAPSULE | Refills: 0 | Status: SHIPPED | OUTPATIENT
Start: 2024-10-09

## 2024-10-29 ENCOUNTER — TELEPHONE (OUTPATIENT)
Dept: FAMILY MEDICINE CLINIC | Facility: CLINIC | Age: 44
End: 2024-10-29

## 2024-10-29 NOTE — TELEPHONE ENCOUNTER
"  Caller: Cherie Gutierrez \"Melida\"    Relationship: Self    Best call back number: [phone]      Who are you requesting to speak with (clinical staff, provider,  specific staff member): DARRON MARION       What was the call regarding: PATIENT STATES SHE STARTED PHYSICAL THERAPY AND IS HAVING A LOT OF PAIN. PATIENT STATES SHE IS USING HEAT AND LIDOCAINE PATCHES BUT THEY AREN'T HELPING VERY MUCH. PATIENT ASKS WHAT DARRON MARION RECOMMENDS?  PLEASE ADVISE     "

## 2024-10-31 ENCOUNTER — TELEPHONE (OUTPATIENT)
Dept: ORTHOPEDIC SURGERY | Facility: CLINIC | Age: 44
End: 2024-10-31

## 2024-10-31 NOTE — TELEPHONE ENCOUNTER
She could try upping her meloxicam to 15mg daily instead of 7.5 mg- just for the next few days until I see her.

## 2024-10-31 NOTE — TELEPHONE ENCOUNTER
I have not seen her for her hip. Was this about her knee? If it is her hip, I should probably see her back to evaluate that first.

## 2024-10-31 NOTE — TELEPHONE ENCOUNTER
Provider: CHRISTIANO ESTRADA     Caller: PATIENT     Pharmacy: [facility]     Phone Number: [phone]    Reason for Call: PATIENT STATES SHE STARTED PHYSICAL THERAPY LAST MONDAY FOR HER RIGHT HIP. SHE STATES SHE HAS HAD INCREASED PAIN SINCE THAT TIME. SHE IS ICING AND USING OVER THE COUNTER LIDOCAINE PATCHES BUT NOTHING IS HELPING. SHE IS ASKING IF CHRISTIANO CAN RECOMMEND ANYTHING TO HELP WITH HER PAIN WHICH SHE DESCRIBES AS CONSTANT. SCHEDULED AN APPT FOR 11-5-24.

## 2024-11-05 ENCOUNTER — OFFICE VISIT (OUTPATIENT)
Dept: ORTHOPEDIC SURGERY | Facility: CLINIC | Age: 44
End: 2024-11-05
Payer: COMMERCIAL

## 2024-11-05 VITALS — HEIGHT: 62 IN | TEMPERATURE: 98.6 F | BODY MASS INDEX: 29.22 KG/M2 | WEIGHT: 158.8 LBS

## 2024-11-05 DIAGNOSIS — M54.41 ACUTE RIGHT-SIDED LOW BACK PAIN WITH RIGHT-SIDED SCIATICA: Primary | ICD-10-CM

## 2024-11-05 DIAGNOSIS — R52 PAIN: ICD-10-CM

## 2024-11-05 DIAGNOSIS — M25.551 RIGHT HIP PAIN: ICD-10-CM

## 2024-11-05 RX ORDER — MELOXICAM 15 MG/1
TABLET ORAL
Qty: 30 TABLET | Refills: 0 | Status: SHIPPED | OUTPATIENT
Start: 2024-11-05
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
## 2024-11-14 DIAGNOSIS — F31.9 BIPOLAR 1 DISORDER: ICD-10-CM

## 2024-11-14 RX ORDER — VENLAFAXINE HYDROCHLORIDE 150 MG/1
300 CAPSULE, EXTENDED RELEASE ORAL DAILY
Qty: 60 CAPSULE | Refills: 0 | Status: SHIPPED | OUTPATIENT
Start: 2024-11-14

## 2024-12-26 DIAGNOSIS — F31.9 BIPOLAR 1 DISORDER: ICD-10-CM

## 2024-12-26 RX ORDER — VENLAFAXINE HYDROCHLORIDE 150 MG/1
300 CAPSULE, EXTENDED RELEASE ORAL DAILY
Qty: 60 CAPSULE | Refills: 0 | Status: SHIPPED | OUTPATIENT
Start: 2024-12-26

## 2025-01-07 RX ORDER — LIDOCAINE HYDROCHLORIDE 20 MG/ML
5 SOLUTION OROPHARYNGEAL AS NEEDED
Qty: 100 ML | Refills: 0 | Status: SHIPPED | OUTPATIENT
Start: 2025-01-07

## 2025-04-30 DIAGNOSIS — Z00.00 ANNUAL PHYSICAL EXAM: Primary | ICD-10-CM

## 2025-05-01 LAB
25(OH)D3+25(OH)D2 SERPL-MCNC: 28.9 NG/ML (ref 30–100)
ALBUMIN SERPL-MCNC: 4.9 G/DL (ref 3.5–5.2)
ALBUMIN/GLOB SERPL: 2.5 G/DL
ALP SERPL-CCNC: 48 U/L (ref 39–117)
ALT SERPL-CCNC: 13 U/L (ref 1–33)
AST SERPL-CCNC: 16 U/L (ref 1–32)
BASOPHILS # BLD AUTO: 0.03 10*3/MM3 (ref 0–0.2)
BASOPHILS NFR BLD AUTO: 0.4 % (ref 0–1.5)
BILIRUB SERPL-MCNC: 0.5 MG/DL (ref 0–1.2)
BUN SERPL-MCNC: 16 MG/DL (ref 6–20)
BUN/CREAT SERPL: 22.2 (ref 7–25)
CALCIUM SERPL-MCNC: 9.5 MG/DL (ref 8.6–10.5)
CHLORIDE SERPL-SCNC: 104 MMOL/L (ref 98–107)
CHOLEST SERPL-MCNC: 185 MG/DL (ref 0–200)
CHOLEST/HDLC SERPL: 3.85 {RATIO}
CO2 SERPL-SCNC: 21.8 MMOL/L (ref 22–29)
CREAT SERPL-MCNC: 0.72 MG/DL (ref 0.57–1)
EGFRCR SERPLBLD CKD-EPI 2021: 106.5 ML/MIN/1.73
EOSINOPHIL # BLD AUTO: 0.06 10*3/MM3 (ref 0–0.4)
EOSINOPHIL NFR BLD AUTO: 0.7 % (ref 0.3–6.2)
ERYTHROCYTE [DISTWIDTH] IN BLOOD BY AUTOMATED COUNT: 11.7 % (ref 12.3–15.4)
GLOBULIN SER CALC-MCNC: 2 GM/DL
GLUCOSE SERPL-MCNC: 92 MG/DL (ref 65–99)
HBA1C MFR BLD: 5.3 % (ref 4.8–5.6)
HCT VFR BLD AUTO: 42.2 % (ref 34–46.6)
HDLC SERPL-MCNC: 48 MG/DL (ref 40–60)
HGB BLD-MCNC: 14.1 G/DL (ref 12–15.9)
IMM GRANULOCYTES # BLD AUTO: 0.01 10*3/MM3 (ref 0–0.05)
IMM GRANULOCYTES NFR BLD AUTO: 0.1 % (ref 0–0.5)
LDLC SERPL CALC-MCNC: 124 MG/DL (ref 0–100)
LYMPHOCYTES # BLD AUTO: 1.49 10*3/MM3 (ref 0.7–3.1)
LYMPHOCYTES NFR BLD AUTO: 17.8 % (ref 19.6–45.3)
MCH RBC QN AUTO: 29.3 PG (ref 26.6–33)
MCHC RBC AUTO-ENTMCNC: 33.4 G/DL (ref 31.5–35.7)
MCV RBC AUTO: 87.7 FL (ref 79–97)
MONOCYTES # BLD AUTO: 0.43 10*3/MM3 (ref 0.1–0.9)
MONOCYTES NFR BLD AUTO: 5.1 % (ref 5–12)
NEUTROPHILS # BLD AUTO: 6.37 10*3/MM3 (ref 1.7–7)
NEUTROPHILS NFR BLD AUTO: 75.9 % (ref 42.7–76)
NRBC BLD AUTO-RTO: 0 /100 WBC (ref 0–0.2)
PLATELET # BLD AUTO: 247 10*3/MM3 (ref 140–450)
POTASSIUM SERPL-SCNC: 4 MMOL/L (ref 3.5–5.2)
PROT SERPL-MCNC: 6.9 G/DL (ref 6–8.5)
RBC # BLD AUTO: 4.81 10*6/MM3 (ref 3.77–5.28)
SODIUM SERPL-SCNC: 139 MMOL/L (ref 136–145)
TRIGL SERPL-MCNC: 67 MG/DL (ref 0–150)
TSH SERPL DL<=0.005 MIU/L-ACNC: 0.5 UIU/ML (ref 0.27–4.2)
VIT B12 SERPL-MCNC: 349 PG/ML (ref 211–946)
VLDLC SERPL CALC-MCNC: 13 MG/DL (ref 5–40)
WBC # BLD AUTO: 8.39 10*3/MM3 (ref 3.4–10.8)

## 2025-05-02 RX ORDER — LANOLIN ALCOHOL/MO/W.PET/CERES
1000 CREAM (GRAM) TOPICAL DAILY
Qty: 30 TABLET | Refills: 3 | Status: SHIPPED | OUTPATIENT
Start: 2025-05-02 | End: 2025-06-01

## 2025-05-02 RX ORDER — ERGOCALCIFEROL 1.25 MG/1
50000 CAPSULE, LIQUID FILLED ORAL WEEKLY
Qty: 8 CAPSULE | Refills: 0 | Status: SHIPPED | OUTPATIENT
Start: 2025-05-02 | End: 2025-07-01

## 2025-05-05 ENCOUNTER — OFFICE VISIT (OUTPATIENT)
Dept: FAMILY MEDICINE CLINIC | Facility: CLINIC | Age: 45
End: 2025-05-05
Payer: COMMERCIAL

## 2025-05-05 VITALS
TEMPERATURE: 98.6 F | HEART RATE: 60 BPM | DIASTOLIC BLOOD PRESSURE: 80 MMHG | BODY MASS INDEX: 28.73 KG/M2 | WEIGHT: 156.1 LBS | HEIGHT: 62 IN | SYSTOLIC BLOOD PRESSURE: 122 MMHG

## 2025-05-05 DIAGNOSIS — E55.9 VITAMIN D DEFICIENCY: ICD-10-CM

## 2025-05-05 DIAGNOSIS — R20.2 PARESTHESIA OF BOTH HANDS: ICD-10-CM

## 2025-05-05 DIAGNOSIS — Z00.00 ANNUAL PHYSICAL EXAM: Primary | ICD-10-CM

## 2025-05-05 DIAGNOSIS — D51.8 OTHER VITAMIN B12 DEFICIENCY ANEMIA: ICD-10-CM

## 2025-05-05 DIAGNOSIS — Z12.31 BREAST CANCER SCREENING BY MAMMOGRAM: ICD-10-CM

## 2025-05-05 PROBLEM — G57.50 TARSAL TUNNEL SYNDROME: Status: RESOLVED | Noted: 2017-10-09 | Resolved: 2025-05-05

## 2025-05-05 PROBLEM — E66.1 CLASS 1 DRUG-INDUCED OBESITY WITHOUT SERIOUS COMORBIDITY WITH BODY MASS INDEX (BMI) OF 32.0 TO 32.9 IN ADULT: Status: RESOLVED | Noted: 2024-05-01 | Resolved: 2025-05-05

## 2025-05-05 PROBLEM — E66.811 CLASS 1 DRUG-INDUCED OBESITY WITHOUT SERIOUS COMORBIDITY WITH BODY MASS INDEX (BMI) OF 32.0 TO 32.9 IN ADULT: Status: RESOLVED | Noted: 2024-05-01 | Resolved: 2025-05-05

## 2025-05-05 PROBLEM — F31.30 BIPOLAR DISORDER, MOST RECENT EPISODE DEPRESSED: Status: ACTIVE | Noted: 2018-10-05

## 2025-05-05 RX ORDER — ARIPIPRAZOLE 2 MG/1
2 TABLET ORAL DAILY
COMMUNITY

## 2025-05-05 RX ORDER — DULOXETIN HYDROCHLORIDE 20 MG/1
20 CAPSULE, DELAYED RELEASE ORAL 2 TIMES DAILY
COMMUNITY

## 2025-05-05 RX ORDER — GUANFACINE 1 MG/1
1 TABLET, EXTENDED RELEASE ORAL DAILY
COMMUNITY

## 2025-05-05 NOTE — PROGRESS NOTES
Emilia Gutierrez is a 44 y.o. female.   Patient here for annual physical exam, labs, chronic illness management and updated screening.      History of Present Illness     The following portions of the patient's history were reviewed and updated as appropriate: allergies, current medications, past family history, past medical history, past social history, past surgical history and problem list.       The patient is a 44-year-old female who presents for an annual exam and lab review.    She reports overall good health but expresses concern about her B12 and D levels, which she believes may be contributing to her fatigue and chronic pain. She has been adhering to a diabetic diet despite not being diagnosed with diabetes or prediabetes. Her diet includes low glycemic index fruits, balanced meals with proteins, fats, and whole grains, and she has increased her physical activity. She has lost weight, dropping from 180 pounds to 156 pounds. She primarily consumes water and smoothies. She has initiated vitamin D supplementation at a dose of 2000 IU daily.    She experiences frequent urination and changes in her vision, particularly with close-up tasks, and acknowledges the need for an ophthalmology consultation. She has not yet undergone a mammogram and is due for a Pap smear. She maintains a stable sexual relationship and is under the care of a gynecologist, Dr. Collins. She also needs to schedule a dental appointment.    She has a history of smoking and vaping, which she plans to discontinue, along with marijuana use. She reports no gastrointestinal issues such as diarrhea or constipation.    She experiences low back pain, neck tension, and shoulder pain. She is making efforts to improve her diet, take supplements, and create a relaxing environment.    She has been experiencing tics, initially presenting as mouth twitching, which have not resolved despite discontinuing Abilify. The tics have since progressed to  include a vocal component. She is currently on Intuniv 1 mg for these symptoms, which she started on Friday. Managed by psych    She has been experiencing sleep disturbances due to hypomania/mixed episodes but is making efforts to improve her sleep hygiene.    SOCIAL HISTORY  The patient was a smoker for a long time and then switched from cigarettes to vaping. She is now planning to quit vaping and using weed.    FAMILY HISTORY  Both of the patient's parents are living.       Review of Systems   Constitutional:  Negative for activity change, fatigue, unexpected weight gain and unexpected weight loss.   HENT:  Negative for congestion and postnasal drip.    Eyes:  Negative for blurred vision and double vision.   Respiratory:  Negative for cough and chest tightness.    Cardiovascular:  Negative for chest pain, palpitations and leg swelling.   Gastrointestinal:  Negative for abdominal pain, constipation, diarrhea and GERD.   Endocrine: Negative for cold intolerance, heat intolerance, polydipsia, polyphagia and polyuria.   Genitourinary:  Negative for dysuria and frequency.   Musculoskeletal:  Positive for back pain and neck pain. Negative for arthralgias.   Neurological:  Positive for numbness. Negative for dizziness.   Hematological:  Does not bruise/bleed easily.   Psychiatric/Behavioral:  Negative for depressed mood. The patient is not nervous/anxious.          Current Outpatient Medications:     ARIPiprazole (ABILIFY) 2 MG tablet, Take 1 tablet by mouth Daily., Disp: , Rfl:     cetirizine (zyrTEC) 10 MG tablet, TAKE 1 TABLET BY MOUTH DAILY, Disp: 90 tablet, Rfl: 0    DULoxetine (CYMBALTA) 20 MG capsule, Take 1 capsule by mouth 2 (Two) Times a Day., Disp: , Rfl:     fluticasone (FLONASE) 50 MCG/ACT nasal spray, 2 sprays into the nostril(s) as directed by provider Daily., Disp: 18.2 mL, Rfl: 2    guanFACINE HCl ER (Intuniv) 1 MG tablet sustained-release 24 hour tablet, Take 1 tablet by mouth Daily., Disp: , Rfl:      hydrOXYzine (ATARAX) 25 MG tablet, TAKE 1 TABLET BY MOUTH TWICE DAILY AS NEEDED FOR ANXIETY, Disp: 60 tablet, Rfl: 0    vitamin B-12 (CYANOCOBALAMIN) 1000 MCG tablet, Take 1 tablet by mouth Daily for 30 days., Disp: 30 tablet, Rfl: 3    vitamin D (ERGOCALCIFEROL) 1.25 MG (49688 UT) capsule capsule, Take 1 capsule by mouth 1 (One) Time Per Week for 60 days., Disp: 8 capsule, Rfl: 0    Objective   Physical Exam  Vitals reviewed.   Constitutional:       Appearance: Normal appearance. She is normal weight.   HENT:      Head: Normocephalic.      Right Ear: Tympanic membrane normal.      Left Ear: Tympanic membrane normal.      Nose: Nose normal.      Mouth/Throat:      Mouth: Mucous membranes are moist.   Eyes:      Pupils: Pupils are equal, round, and reactive to light.   Cardiovascular:      Rate and Rhythm: Normal rate and regular rhythm.      Pulses: Normal pulses.      Heart sounds: Normal heart sounds.   Pulmonary:      Effort: Pulmonary effort is normal.      Breath sounds: Normal breath sounds.   Abdominal:      General: Abdomen is flat. Bowel sounds are normal.      Palpations: Abdomen is soft.   Musculoskeletal:         General: Normal range of motion.      Cervical back: Normal range of motion.   Skin:     General: Skin is warm.   Neurological:      General: No focal deficit present.      Mental Status: She is alert.   Psychiatric:         Mood and Affect: Mood normal.         Vitals:    05/05/25 1334   BP: 122/80   Pulse: 60   Temp: 98.6 °F (37 °C)     Body mass index is 28.54 kg/m².    Procedures    TSH   Date Value Ref Range Status   04/30/2025 0.501 0.270 - 4.200 uIU/mL Final     Hemoglobin A1C   Date Value Ref Range Status   04/30/2025 5.30 4.80 - 5.60 % Final     Comment:     Hemoglobin A1C Ranges:  Increased Risk for Diabetes  5.7% to 6.4%  Diabetes                     >= 6.5%  Diabetic Goal                < 7.0%              Over the past 2 weeks, how often have you been bothered by any of the  following problems?  Little interest or pleasure in doing things: Not at all  Feeling down, depressed, or hopeless: Not at all      Assessment & Plan   Problems Addressed this Visit       Paresthesia of both hands     Other Visit Diagnoses         Annual physical exam    -  Primary      Vitamin D deficiency          Other vitamin B12 deficiency anemia          Breast cancer screening by mammogram        Relevant Orders    Mammo Screening Digital Tomosynthesis Bilateral With CAD          Diagnoses         Codes Comments      Annual physical exam    -  Primary ICD-10-CM: Z00.00  ICD-9-CM: V70.0       Vitamin D deficiency     ICD-10-CM: E55.9  ICD-9-CM: 268.9       Other vitamin B12 deficiency anemia     ICD-10-CM: D51.8  ICD-9-CM: 281.1       Paresthesia of both hands     ICD-10-CM: R20.2  ICD-9-CM: 782.0       Breast cancer screening by mammogram     ICD-10-CM: Z12.31  ICD-9-CM: V76.12           REVIEWED LABS     Assessment & Plan  1. Annual examination.  - Blood glucose levels are within the normal range, indicating no predisposition to diabetes. Renal function is optimal, electrolyte balance is maintained, and hepatic function is satisfactory with no signs of fatty liver disease. Hematological parameters suggest a healthy immune system, with no evidence of anemia or coagulopathy. Lipid profile is commendable, with total cholesterol below 200, low triglycerides, and high HDL levels. Thyroid function is within normal limits.  - Weight loss from 180 pounds to 156 pounds is noted, reflecting the positive impact of lifestyle modifications on overall health and laboratory results.  - Advised to continue dietary modifications, including reducing intake of refined foods, fried foods, and red meat, and increasing consumption of fruits, vegetables, fresh foods, and water. Emphasized the importance of adequate sleep and regular exercise for mobility and movement.  - A mammogram will be scheduled at a location convenient to  her residence. A COVID-19 vaccine will be administered today. A Pap smear is due in April 2026 unless any issues arise before then. Advised to report any changes in breast tissue or nipples.    2. Vitamin B12 deficiency.  - B12 level is 349, which is below the desired level of over 500.  - Advised to take B12 supplements daily and incorporate fortified cereals into her diet.  - Prescription for B12 1000 mcg daily will be provided.  - Monitoring for improvement in symptoms such as pain, numbness, tingling, and memory problems.    3. Vitamin D deficiency.  - Vitamin D level is 28, which is below the desired level of above 40.  - Advised to take vitamin D supplements daily and incorporate fortified cereals into her diet.  - Prescription for vitamin D 50,000 units once a week for 2 months will be provided, followed by a daily dose of 2000 IU.  - Monitoring for improvement in energy levels and overall health.    4. Chronic pain.  - Reports chronic pain, including low back pain and neck and shoulder tension.  - Advised to continue with her current regimen of duloxetine 40 mg (20 mg capsules taken twice a day). If necessary, the dose can be increased to 60 mg after consulting with her psychiatrist, although there may not be significant additional benefit for pain management at higher doses.  - Encouraged to continue daily movement and stretching exercises to manage pain.  - Monitoring for effectiveness of pain management strategies.    5. Tics.  - Developed tics, initially presenting as mouth twitching with a vocal component.  - Currently on Intuniv 1 mg for these symptoms, which she started on Friday.  - Advised to continue with Intuniv and monitor for any changes.  - Monitoring for improvement in tic symptoms and overall neurological health.    6. Health Maintenance.  - Advised to schedule an eye exam and a dental appointment.  - A mammogram will be scheduled at a location convenient to her residence.  - A COVID-19  vaccine will be administered today.    Follow-up  The patient will follow up in 6 months.      Preventative care- Follow heart healthy diet, drink water, walk daily. Wear seatbelts, wear helmets, wear sunscreens. Follow CDC guidelines for covid and flu .          Education provided in AVS   Return in about 6 months (around 11/5/2025) for Recheck.    Patient or patient representative verbalized consent for the use of Ambient Listening during the visit with  KARON Dunn for chart documentation. 5/5/2025  13:55 EDT